# Patient Record
Sex: FEMALE | Race: WHITE
[De-identification: names, ages, dates, MRNs, and addresses within clinical notes are randomized per-mention and may not be internally consistent; named-entity substitution may affect disease eponyms.]

---

## 2020-02-16 ENCOUNTER — HOSPITAL ENCOUNTER (EMERGENCY)
Dept: HOSPITAL 62 - ER | Age: 55
Discharge: HOME | End: 2020-02-16
Payer: OTHER GOVERNMENT

## 2020-02-16 VITALS — SYSTOLIC BLOOD PRESSURE: 135 MMHG | DIASTOLIC BLOOD PRESSURE: 76 MMHG

## 2020-02-16 DIAGNOSIS — M54.9: ICD-10-CM
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
## 2020-02-16 DIAGNOSIS — S23.9XXA: Primary | ICD-10-CM

## 2020-02-16 DIAGNOSIS — M62.830: ICD-10-CM

## 2020-02-16 PROCEDURE — 99283 EMERGENCY DEPT VISIT LOW MDM: CPT

## 2020-02-16 PROCEDURE — 96372 THER/PROPH/DIAG INJ SC/IM: CPT

## 2020-02-16 NOTE — ER DOCUMENT REPORT
HPI





- HPI


Time Seen by Provider: 02/16/20 17:26


Context: 





54-year-old female presents with left mid back pain.  Patient was driving from 

[place] in car and states she "twisted wrong."  Patient states she has had this 

occur previously and it was a muscle spasm/muscle strain.  Patient denies any 

difficulty with urinating or defecating.  Patient denies any saddle anesthesia.





Past Medical History





- General


Information source: Patient





- Social History


Smoking Status: Unknown if Ever Smoked


Family History: None





Vertical Provider Document





- CONSTITUTIONAL


Agree With Documented VS: Yes


Notes: 





GENERAL: Well-appearing, well-nourished and in no acute distress.


HEAD: Atraumatic, normocephalic.


EYES: Extraocular movements intact, sclera anicteric, conjunctiva are normal.


NECK: Normal range of motion, supple without lymphadenopathy or JVD.


EXTREMITIES: Normal range of motion, no pitting or edema.  No clubbing or 

cyanosis.


BACK: No spinal tenderness. Tenderness to left lower thoracic paraspinal 

muscles. 


NEUROLOGICAL: Cranial nerves II through XII grossly intact.  Normal speech, 

normal gait.


PSYCH: Normal mood, normal affect.


SKIN: Warm, Dry, normal turgor, no rashes or lesions noted.





Course





- Re-evaluation


Re-evalutation: 





02/16/20 No rapid progression of symptoms, systemic symptoms including fevers, 

chills, weight loss, history of recent bacterial infection, bilateral symptoms, 

numbness, weakness, difficulty walking, urinary retention or bowel incontinence,

personal history of cancer, immunosuppression, diabetes, known AAA, or history 

of IV drug use.  Exam is without point tenderness over vertebral bodies, 

pulsatile abdominal mass, and patient has symmetric and intact lower extremity 

strength. Based on history and physical, I have a very low suspicion of a 

concerning etiology of pain including epidural compression syndrome, spinal 

infection, transverse myelitis, malignancy, abdominal aortic aneurysm, renal 

colic, acute lower extremity claudication, neurogenic claudication, ankylosing 

spondylitis, or other intra-abdominal process. Due to absence of concerning risk

factors in history and physical as well as absence of rapidly progressive, 

severe, or bilateral symptoms, will defer imaging at this point. Pt to be given 

prescription for steroids as she states she has had this in past and steroids 

usually work best and prescription for Flexeril with sedation warnings. Pt also 

provided strict return precautions and follow up with PCP. Pt voices 

understanding and agrees with plan of care.








- Vital Signs


Vital signs: 


                                        











Temp Pulse Resp BP Pulse Ox


 


 98.3 F   62   18   135/76 H  100 


 


 02/16/20 17:21  02/16/20 17:21  02/16/20 17:21  02/16/20 17:21  02/16/20 17:21














Discharge





- Discharge


Clinical Impression: 


Thoracic back sprain


Qualifiers:


 Encounter type: initial encounter Qualified Code(s): S23.9XXA - Sprain of u

nspecified parts of thorax, initial encounter





Condition: Stable


Disposition: HOME, SELF-CARE


Instructions:  Muscle Strain ([facility]), Warm Packs ([facility])


Additional Instructions: 


Please take medications as prescribed. Do not drink/drive while taking muscle 

relaxer as it may make you drowsy. Use lidoderm patches as needed. Apply heat to

the area as often as you are able. Continue to keep active and avoid prolonged 

periods of bed rest.


 


Please follow up with your doctor as soon as possible regarding today's ED visit

and your back pain.  Return to the ED for worsening back pain, fever, weakness 

or numbness of either leg, or if you develop either (1) an inability to urinate 

or have bowel movements, or (2) loss of your ability to control your bathroom 

functions (if you start having "accidents"), or if you develop other new 

symptoms that concern you.concern you.


Prescriptions: 


Prednisone [Deltasone 20 mg Tablet] 1 tab PO BID 5 Days #10 tablet


Cyclobenzaprine HCl [Flexeril 10 mg Tablet] 10 mg PO TIDP PRN #15 tab


 PRN Reason: 


Lidocaine [Lidoderm 5% (700 mg) Transdermal Patch] 1 patch TP DAILY #14 

adh..patch


Referrals: 


RASHMI NICHOLSON MD [ACTIVE STAFF] - Follow up as needed


[facility] [Provider Group] - Follow up as needed